# Patient Record
Sex: MALE | URBAN - METROPOLITAN AREA
[De-identification: names, ages, dates, MRNs, and addresses within clinical notes are randomized per-mention and may not be internally consistent; named-entity substitution may affect disease eponyms.]

---

## 2023-06-30 ENCOUNTER — OFFICE VISIT (OUTPATIENT)
Dept: URBAN - METROPOLITAN AREA CLINIC 33 | Facility: CLINIC | Age: 48
End: 2023-06-30

## 2023-06-30 DIAGNOSIS — H52.13 MYOPIA, BILATERAL: ICD-10-CM

## 2023-06-30 DIAGNOSIS — H16.041 MARGINAL CORNEAL ULCER, RIGHT EYE: Primary | ICD-10-CM

## 2023-06-30 PROCEDURE — 92002 INTRM OPH EXAM NEW PATIENT: CPT | Performed by: OPTOMETRIST

## 2023-06-30 ASSESSMENT — INTRAOCULAR PRESSURE
OS: 11
OD: 10

## 2023-06-30 NOTE — IMPRESSION/PLAN
Impression: Myopia, bilateral: H52.13. Plan: Patient wanted glasses/CL RX today. Unable to refract due to dilation. Recommend patient returns in 1-2 weeks for refraction + CL fit.  Issued CL trials as courtesy to get patient by until vision exam.

## 2023-06-30 NOTE — IMPRESSION/PLAN
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 Plan: Corneal ulcer resolved. Patient reports he sometimes sleeps in CL. Advised patient against sleeping in CL. Patient may resume CL wear.  

D/C topical antibiotic

## 2023-07-07 ENCOUNTER — TESTING ONLY (OUTPATIENT)
Dept: URBAN - METROPOLITAN AREA CLINIC 33 | Facility: CLINIC | Age: 48
End: 2023-07-07
Payer: COMMERCIAL

## 2023-07-07 DIAGNOSIS — H52.13 MYOPIA, BILATERAL: Primary | ICD-10-CM

## 2023-07-07 PROCEDURE — 92310 CONTACT LENS FITTING OU: CPT | Performed by: OPTOMETRIST

## 2023-07-07 ASSESSMENT — VISUAL ACUITY
OS: 20/20
OD: 20/20

## 2023-07-07 NOTE — IMPRESSION/PLAN
Impression: Myopia, bilateral: H52.13. Plan: Issued glasses/contact lens RX today. Patient satisfied with vision on CL.  Return in 1 year for vision exam.

## 2024-10-08 ENCOUNTER — OFFICE VISIT (OUTPATIENT)
Dept: URBAN - METROPOLITAN AREA CLINIC 33 | Facility: CLINIC | Age: 49
End: 2024-10-08

## 2024-10-08 DIAGNOSIS — H52.13 MYOPIA, BILATERAL: Primary | ICD-10-CM

## 2024-10-08 PROCEDURE — 92310 CONTACT LENS FITTING OU: CPT

## 2024-10-08 PROCEDURE — 99213 OFFICE O/P EST LOW 20 MIN: CPT

## 2024-10-08 ASSESSMENT — VISUAL ACUITY
OS: 20/20
OD: 20/20

## 2024-10-08 ASSESSMENT — KERATOMETRY
OD: 44.38
OS: 44.38

## 2024-10-08 ASSESSMENT — INTRAOCULAR PRESSURE
OS: 12
OD: 11